# Patient Record
Sex: MALE | Race: WHITE | URBAN - METROPOLITAN AREA
[De-identification: names, ages, dates, MRNs, and addresses within clinical notes are randomized per-mention and may not be internally consistent; named-entity substitution may affect disease eponyms.]

---

## 2017-03-23 ENCOUNTER — IMPORTED ENCOUNTER (OUTPATIENT)
Dept: URBAN - METROPOLITAN AREA CLINIC 50 | Facility: CLINIC | Age: 82
End: 2017-03-23

## 2017-03-29 ENCOUNTER — IMPORTED ENCOUNTER (OUTPATIENT)
Dept: URBAN - METROPOLITAN AREA CLINIC 50 | Facility: CLINIC | Age: 82
End: 2017-03-29

## 2017-04-04 ENCOUNTER — IMPORTED ENCOUNTER (OUTPATIENT)
Dept: URBAN - METROPOLITAN AREA CLINIC 50 | Facility: CLINIC | Age: 82
End: 2017-04-04

## 2018-03-28 ENCOUNTER — IMPORTED ENCOUNTER (OUTPATIENT)
Dept: URBAN - METROPOLITAN AREA CLINIC 50 | Facility: CLINIC | Age: 83
End: 2018-03-28

## 2019-04-10 ENCOUNTER — IMPORTED ENCOUNTER (OUTPATIENT)
Dept: URBAN - METROPOLITAN AREA CLINIC 50 | Facility: CLINIC | Age: 84
End: 2019-04-10

## 2019-04-11 ENCOUNTER — IMPORTED ENCOUNTER (OUTPATIENT)
Dept: URBAN - METROPOLITAN AREA CLINIC 50 | Facility: CLINIC | Age: 84
End: 2019-04-11

## 2019-06-11 ENCOUNTER — IMPORTED ENCOUNTER (OUTPATIENT)
Dept: URBAN - METROPOLITAN AREA CLINIC 50 | Facility: CLINIC | Age: 84
End: 2019-06-11

## 2020-04-29 ENCOUNTER — IMPORTED ENCOUNTER (OUTPATIENT)
Dept: URBAN - METROPOLITAN AREA CLINIC 50 | Facility: CLINIC | Age: 85
End: 2020-04-29

## 2021-04-18 ASSESSMENT — PACHYMETRY
OS_CT_UM: 648.4
OD_CT_UM: 628.2
OD_CT_UM: 628.2
OS_CT_UM: 648.4
OD_CT_UM: 628.2
OD_CT_UM: 628.2
OS_CT_UM: 648.4
OD_CT_UM: 628.2

## 2021-04-18 ASSESSMENT — VISUAL ACUITY
OS_CC: 20/20
OS_CC: J1+@ 15 IN
OS_CC: 20/50-2
OS_OTHER: 20/60. 20/80.
OS_BAT: 20/60
OS_BAT: 20/40
OD_CC: J1+
OD_OTHER: 20/25. 20/30.
OS_OTHER: 20/40. 20/50.
OD_CC: 20/30
OS_CC: 20/30+
OD_CC: 20/25
OS_CC: J1+
OD_BAT: 20/50
OD_BAT: 20/25
OD_CC: 20/30-1
OD_CC: 20/25-
OS_CC: 20/30
OD_CC: 20/30
OD_CC: J1+
OD_CC: J1+@ 15 IN
OS_CC: J1+@ 14 IN
OS_PH: 20/25
OD_CC: J1+@ 14 IN
OD_CC: 20/20
OD_OTHER: 20/50. 20/70.
OD_PH: 20/30+
OS_CC: J1+
OS_CC: 20/30

## 2021-04-18 ASSESSMENT — TONOMETRY
OS_IOP_MMHG: 15
OD_IOP_MMHG: 10
OS_IOP_MMHG: 13
OD_IOP_MMHG: 14
OS_IOP_MMHG: 15
OS_IOP_MMHG: 11
OS_IOP_MMHG: 11
OS_IOP_MMHG: 14
OD_IOP_MMHG: 11
OS_IOP_MMHG: 10
OS_IOP_MMHG: 14
OS_IOP_MMHG: 10
OD_IOP_MMHG: 15
OD_IOP_MMHG: 10
OD_IOP_MMHG: 15
OD_IOP_MMHG: 14
OD_IOP_MMHG: 14
OD_IOP_MMHG: 10

## 2021-05-10 ENCOUNTER — PREPPED CHART (OUTPATIENT)
Dept: URBAN - METROPOLITAN AREA CLINIC 52 | Facility: CLINIC | Age: 86
End: 2021-05-10

## 2021-05-11 ENCOUNTER — COMPREHENSIVE EXAM (OUTPATIENT)
Dept: URBAN - METROPOLITAN AREA CLINIC 52 | Facility: CLINIC | Age: 86
End: 2021-05-11

## 2021-05-11 DIAGNOSIS — H26.491: ICD-10-CM

## 2021-05-11 DIAGNOSIS — H16.223: ICD-10-CM

## 2021-05-11 DIAGNOSIS — H18.513: ICD-10-CM

## 2021-05-11 DIAGNOSIS — H35.372: ICD-10-CM

## 2021-05-11 DIAGNOSIS — H43.813: ICD-10-CM

## 2021-05-11 DIAGNOSIS — E11.9: ICD-10-CM

## 2021-05-11 PROCEDURE — 92134 CPTRZ OPH DX IMG PST SGM RTA: CPT

## 2021-05-11 PROCEDURE — 92014 COMPRE OPH EXAM EST PT 1/>: CPT

## 2021-05-11 ASSESSMENT — VISUAL ACUITY
OD_GLARE: 20/40
OD_GLARE: 20/50
OD_CC: 20/25
OS_CC: 20/25
OU_CC: J1-

## 2021-05-11 ASSESSMENT — TONOMETRY
OD_IOP_MMHG: 11
OD_IOP_MMHG: 15
OS_IOP_MMHG: 12
OS_IOP_MMHG: 16

## 2022-05-27 ENCOUNTER — COMPREHENSIVE EXAM (OUTPATIENT)
Dept: URBAN - METROPOLITAN AREA CLINIC 49 | Facility: CLINIC | Age: 87
End: 2022-05-27

## 2022-05-27 DIAGNOSIS — H40.013: ICD-10-CM

## 2022-05-27 DIAGNOSIS — H35.372: ICD-10-CM

## 2022-05-27 DIAGNOSIS — H26.491: ICD-10-CM

## 2022-05-27 DIAGNOSIS — H18.513: ICD-10-CM

## 2022-05-27 DIAGNOSIS — H35.361: ICD-10-CM

## 2022-05-27 DIAGNOSIS — H16.223: ICD-10-CM

## 2022-05-27 DIAGNOSIS — H43.813: ICD-10-CM

## 2022-05-27 PROCEDURE — 92014 COMPRE OPH EXAM EST PT 1/>: CPT

## 2022-05-27 PROCEDURE — 92134 CPTRZ OPH DX IMG PST SGM RTA: CPT

## 2022-05-27 ASSESSMENT — VISUAL ACUITY
OS_CC: 20/30-2
OD_GLARE: 20/30
OU_CC: J1+ @16IN
OD_CC: 20/25
OD_GLARE: 20/40
OS_PH: 20/25-1

## 2022-05-27 ASSESSMENT — TONOMETRY
OD_IOP_MMHG: 15
OS_IOP_MMHG: 15

## 2022-05-27 NOTE — PATIENT DISCUSSION
GLAUCOMA SUSPECT-C/D: The patient is a glaucoma suspect because of suspicious appearance of the optic disc(s). IOP today 15 OU. RTC in 6 months for HVF/RNFL/IOP check.

## 2023-07-11 ENCOUNTER — COMPREHENSIVE EXAM (OUTPATIENT)
Dept: URBAN - METROPOLITAN AREA CLINIC 49 | Facility: CLINIC | Age: 88
End: 2023-07-11

## 2023-07-11 DIAGNOSIS — H43.813: ICD-10-CM

## 2023-07-11 DIAGNOSIS — H35.361: ICD-10-CM

## 2023-07-11 DIAGNOSIS — H52.4: ICD-10-CM

## 2023-07-11 DIAGNOSIS — H35.372: ICD-10-CM

## 2023-07-11 DIAGNOSIS — H26.491: ICD-10-CM

## 2023-07-11 DIAGNOSIS — H40.013: ICD-10-CM

## 2023-07-11 PROCEDURE — 92014 COMPRE OPH EXAM EST PT 1/>: CPT

## 2023-07-11 PROCEDURE — 92015 DETERMINE REFRACTIVE STATE: CPT

## 2023-07-11 PROCEDURE — 92083 EXTENDED VISUAL FIELD XM: CPT

## 2023-07-11 PROCEDURE — 92133 CPTRZD OPH DX IMG PST SGM ON: CPT

## 2023-07-11 ASSESSMENT — VISUAL ACUITY
OU_CC: J1+
OS_CC: 20/30
OD_GLARE: 20/50
OD_CC: 20/30
OD_GLARE: 20/40
OU_CC: 20/30

## 2023-07-11 ASSESSMENT — TONOMETRY
OD_IOP_MMHG: 14
OS_IOP_MMHG: 15